# Patient Record
Sex: MALE | Race: WHITE | Employment: OTHER | URBAN - METROPOLITAN AREA
[De-identification: names, ages, dates, MRNs, and addresses within clinical notes are randomized per-mention and may not be internally consistent; named-entity substitution may affect disease eponyms.]

---

## 2023-02-28 ENCOUNTER — HOSPITAL ENCOUNTER (EMERGENCY)
Age: 48
Discharge: HOME | End: 2023-02-28
Attending: EMERGENCY MEDICINE
Payer: OTHER GOVERNMENT

## 2023-02-28 VITALS
BODY MASS INDEX: 32.9 KG/M2 | SYSTOLIC BLOOD PRESSURE: 144 MMHG | HEIGHT: 71 IN | WEIGHT: 235 LBS | DIASTOLIC BLOOD PRESSURE: 86 MMHG | RESPIRATION RATE: 18 BRPM | OXYGEN SATURATION: 99 % | HEART RATE: 80 BPM

## 2023-02-28 DIAGNOSIS — K64.9 HEMORRHOIDS, UNSPECIFIED HEMORRHOID TYPE: Primary | ICD-10-CM

## 2023-02-28 PROCEDURE — 6360000002 HC RX W HCPCS: Performed by: NURSE PRACTITIONER

## 2023-02-28 PROCEDURE — 99284 EMERGENCY DEPT VISIT MOD MDM: CPT

## 2023-02-28 PROCEDURE — 96372 THER/PROPH/DIAG INJ SC/IM: CPT

## 2023-02-28 PROCEDURE — 6370000000 HC RX 637 (ALT 250 FOR IP): Performed by: NURSE PRACTITIONER

## 2023-02-28 RX ORDER — OXYCODONE AND ACETAMINOPHEN 7.5; 325 MG/1; MG/1
1 TABLET ORAL
Status: COMPLETED | OUTPATIENT
Start: 2023-02-28 | End: 2023-02-28

## 2023-02-28 RX ORDER — DOCUSATE SODIUM 100 MG/1
100 CAPSULE, LIQUID FILLED ORAL 2 TIMES DAILY
Qty: 30 CAPSULE | Refills: 0 | Status: SHIPPED | OUTPATIENT
Start: 2023-02-28

## 2023-02-28 RX ORDER — PRAMOXINE HYDROCHLORIDE 10 MG/G
AEROSOL, FOAM TOPICAL
Qty: 15 G | Refills: 0 | Status: SHIPPED | OUTPATIENT
Start: 2023-02-28

## 2023-02-28 RX ORDER — OXYCODONE HYDROCHLORIDE 5 MG/1
5 TABLET ORAL EVERY 8 HOURS PRN
Qty: 9 TABLET | Refills: 0 | Status: SHIPPED | OUTPATIENT
Start: 2023-02-28 | End: 2023-03-03

## 2023-02-28 RX ORDER — HYDROCORTISONE ACETATE 25 MG/1
25 SUPPOSITORY RECTAL 2 TIMES DAILY
Qty: 12 SUPPOSITORY | Refills: 0 | Status: SHIPPED | OUTPATIENT
Start: 2023-02-28

## 2023-02-28 RX ORDER — MORPHINE SULFATE 4 MG/ML
4 INJECTION INTRAVENOUS ONCE
Status: COMPLETED | OUTPATIENT
Start: 2023-02-28 | End: 2023-02-28

## 2023-02-28 RX ADMIN — MORPHINE SULFATE 4 MG: 4 INJECTION INTRAVENOUS at 15:09

## 2023-02-28 RX ADMIN — OXYCODONE AND ACETAMINOPHEN 1 TABLET: 7.5; 325 TABLET ORAL at 14:39

## 2023-02-28 ASSESSMENT — ENCOUNTER SYMPTOMS
VOMITING: 0
NAUSEA: 0
RECTAL PAIN: 1
DIARRHEA: 0
BACK PAIN: 0
HEMATOCHEZIA: 0
CONSTIPATION: 1
ABDOMINAL PAIN: 0

## 2023-02-28 ASSESSMENT — PAIN DESCRIPTION - LOCATION
LOCATION: RECTUM

## 2023-02-28 ASSESSMENT — PAIN SCALES - GENERAL
PAINLEVEL_OUTOF10: 10
PAINLEVEL_OUTOF10: 6

## 2023-02-28 ASSESSMENT — LIFESTYLE VARIABLES
HOW MANY STANDARD DRINKS CONTAINING ALCOHOL DO YOU HAVE ON A TYPICAL DAY: 3 OR 4
HOW OFTEN DO YOU HAVE A DRINK CONTAINING ALCOHOL: MONTHLY OR LESS

## 2023-02-28 NOTE — ED NOTES
I have reviewed discharge instructions with the patient. The patient verbalized understanding. Patient left ED via Discharge Method: ambulatory to Home with  an Patricia 19 for questions and clarification provided. Patient given 4 scripts. Rx sent to [facility]        To continue your aftercare when you leave the hospital, you may receive an automated call from our care team to check in on how you are doing. This is a free service and part of our promise to provide the best care and service to meet your aftercare needs.  If you have questions, or wish to unsubscribe from this service please call [phone]. Thank you for Choosing our [facility] Emergency Department.         Sylvain Noel RN  02/28/23 9398

## 2023-02-28 NOTE — DISCHARGE INSTRUCTIONS
Take medication as prescribed. The pain medication can make constipation worse. Use over-the-counter Miralax if needed in addition to the stool softener. Use this sparingly. Continue Sitz baths. Continue high-fiber diet. Follow-up with your primary care provider. Return to the ER for any new, worsening, or concerning symptoms.

## 2023-02-28 NOTE — ED PROVIDER NOTES
Emergency Department Provider Note                   PCP:                None Provider               Age: 50 y.o. Sex: male       ICD-10-CM    1. Hemorrhoids, unspecified hemorrhoid type  K64.9 oxyCODONE (ROXICODONE) 5 MG immediate release tablet          DISPOSITION Decision To Discharge 02/28/2023 03:41:24 PM       MEDICAL DECISION MAKING  Complexity of Problems Addressed:  Chronic illness with acute exacerbation    Data Reviewed and Analyzed:  Category 1:   I reviewed records from an external source: [facility] and no [facility] available at time of treatment. I ordered each unique test.  I reviewed the results of each unique test.        Category 2:     Category 3: Discussion of management or test interpretation. 49-year-old male presents to the emergency department today with complaint of hemorrhoid pain after a difficult bowel movement 1 week ago. He attempted a bowel movement today which increased pain. He appears uncomfortable but in no acute distress. Current symptoms consistent with previous episodes of hemorrhoids. Discussed treatment options with pain control and steroid suppository. Discussed Labs and CT imaging today to rule out other possible acute process but patient states pain is consistent with hemorrhoid pain. Physical exam reveals internal hemorrhoids. No sign of abscess on exam. He is afebrile and appears otherwise well today. Risk of Complications and/or Morbidity of Patient Management:  Prescription drug management completed, Parental controlled substances given in the ED, and Considerations: The following items were considered but not ordered: labs, CT of abdomen and pelvis- through shared decision making, no further workup initiated in the ER today. Patient is hemodynamically stable and afebrile. Symptoms consistent with previous episodes of hemorrhoid pain.       Rob Mejia is a 50 y.o. male who presents to the Emergency Department with chief complaint of    Chief Complaint   Patient presents with    Hemorrhoids      47-year-old male who presents to the emergency department today with complaint of hemorrhoids. He states he had a very difficult bowel movement 1 week ago and has been having pain since. He has been using Preparation-H without relief. He reports a history of hemorrhoids. Pain is consistent with hemorrhoid pain but worse than previous episodes. He reports constipation. He denies any fever, chills, chest pain, abdominal pain, nausea, vomiting, or diarrhea. He states he has no significant medical history. He follows a high fiber diet and this is typically enough to manage hemorrhoids. The history is provided by the patient. Constipation  Severity:  Moderate  Chronicity:  Chronic  Relieved by:  Fiber  Ineffective treatments: preparation-h. Associated symptoms: no abdominal pain, no back pain, no diarrhea, no dysuria, no fever, no hematochezia, no nausea, no urinary retention and no vomiting       Review of Systems   Constitutional:  Negative for fever. Gastrointestinal:  Positive for constipation and rectal pain. Negative for abdominal pain, diarrhea, hematochezia, nausea and vomiting. Genitourinary:  Negative for dysuria. Musculoskeletal:  Negative for back pain. All other systems reviewed and are negative. Vitals signs and nursing note reviewed. Patient Vitals for the past 4 hrs:   Pulse Resp BP SpO2   02/28/23 1543 80 18 (!) 144/86 99 %   02/28/23 1430 100 18 (!) 156/76 97 %          Physical Exam  Vitals and nursing note reviewed. Exam conducted with a chaperone present (BORIS Blanca). Constitutional:       General: He is not in acute distress. Appearance: Normal appearance. He is not ill-appearing, toxic-appearing or diaphoretic. Comments: Appears uncomfortable but is in no acute distress   HENT:      Head: Normocephalic and atraumatic.       Right Ear: External ear normal.      Left Ear: External ear normal.      Nose: Nose normal.   Eyes:      Extraocular Movements: Extraocular movements intact. Conjunctiva/sclera: Conjunctivae normal.   Cardiovascular:      Rate and Rhythm: Normal rate. Pulmonary:      Effort: Pulmonary effort is normal. No respiratory distress. Abdominal:      General: Abdomen is flat. There is no distension. Genitourinary:     Rectum: Tenderness and internal hemorrhoid present. No mass, anal fissure or external hemorrhoid. Normal anal tone. Comments: Internal hemorrhoids with tenderness. No bleeding noted. No external hemorrhoids noted. Musculoskeletal:         General: Normal range of motion. Cervical back: Normal range of motion. Skin:     General: Skin is warm and dry. Neurological:      General: No focal deficit present. Mental Status: He is alert and oriented to person, place, and time. Psychiatric:         Mood and Affect: Mood normal.         Behavior: Behavior normal.        Procedures          No orders of the defined types were placed in this encounter. Medications   oxyCODONE-acetaminophen (PERCOCET) 7.5-325 MG per tablet 1 tablet (1 tablet Oral Given 2/28/23 4458)   morphine injection 4 mg (4 mg IntraMUSCular Given 2/28/23 9076)       Discharge Medication List as of 2/28/2023  3:42 PM        START taking these medications    Details   pramoxine HCl 1 % foam Apply as needed up to 5 times daily. , Disp-15 g, R-0, Normal      hydrocortisone (ANUSOL-HC) 25 MG suppository Place 1 suppository rectally 2 times daily, Disp-12 suppository, R-0Normal      docusate sodium (COLACE) 100 MG capsule Take 1 capsule by mouth 2 times daily, Disp-30 capsule, R-0Normal      oxyCODONE (ROXICODONE) 5 MG immediate release tablet Take 1 tablet by mouth every 8 hours as needed for Pain for up to 3 days. Intended supply: 3 days. Take lowest dose possible to manage pain Max Daily Amount: 15 mg, Disp-9 tablet, R-0Normal              History reviewed. No pertinent past medical history. Past Surgical History:   Procedure Laterality Date    ABDOMEN SURGERY      ORTHOPEDIC SURGERY          History reviewed. No pertinent family history. Social History     Socioeconomic History    Marital status:      Spouse name: None    Number of children: None    Years of education: None    Highest education level: None   Tobacco Use    Smoking status: Never    Smokeless tobacco: Never   Substance and Sexual Activity    Alcohol use: Never    Drug use: Yes     Comment: thc gummies        Allergies: Patient has no known allergies. Discharge Medication List as of 2/28/2023  3:42 PM           No results found for any visits on 02/28/23. No orders to display                     Voice dictation software was used during the making of this note. This software is not perfect and grammatical and other typographical errors may be present. This note has not been completely proofread for errors.        MARGARITO Ruiz - [place]  02/28/23 7550

## 2023-02-28 NOTE — ED NOTES
Rectal exam by [facility] reveals internal hemorrhoids.       Greg Mederos, RN  02/28/23 3708

## 2023-03-02 ENCOUNTER — HOSPITAL ENCOUNTER (EMERGENCY)
Age: 48
Discharge: HOME | End: 2023-03-02
Attending: EMERGENCY MEDICINE
Payer: OTHER GOVERNMENT

## 2023-03-02 VITALS
BODY MASS INDEX: 32.9 KG/M2 | HEART RATE: 86 BPM | OXYGEN SATURATION: 95 % | HEIGHT: 71 IN | RESPIRATION RATE: 18 BRPM | DIASTOLIC BLOOD PRESSURE: 87 MMHG | TEMPERATURE: 98.9 F | SYSTOLIC BLOOD PRESSURE: 126 MMHG | WEIGHT: 235 LBS

## 2023-03-02 DIAGNOSIS — K64.5 THROMBOSED EXTERNAL HEMORRHOID: Primary | ICD-10-CM

## 2023-03-02 PROCEDURE — 6370000000 HC RX 637 (ALT 250 FOR IP)

## 2023-03-02 PROCEDURE — 99283 EMERGENCY DEPT VISIT LOW MDM: CPT

## 2023-03-02 RX ORDER — OXYCODONE AND ACETAMINOPHEN 7.5; 325 MG/1; MG/1
1 TABLET ORAL
Status: DISCONTINUED | OUTPATIENT
Start: 2023-03-02 | End: 2023-03-02

## 2023-03-02 RX ORDER — LORAZEPAM 0.5 MG/1
0.5 TABLET ORAL ONCE
Status: COMPLETED | OUTPATIENT
Start: 2023-03-02 | End: 2023-03-02

## 2023-03-02 RX ADMIN — LORAZEPAM 0.5 MG: 0.5 TABLET ORAL at 16:16

## 2023-03-02 ASSESSMENT — PAIN SCALES - GENERAL
PAINLEVEL_OUTOF10: 10
PAINLEVEL_OUTOF10: 5
PAINLEVEL_OUTOF10: 4

## 2023-03-02 ASSESSMENT — PAIN DESCRIPTION - PAIN TYPE: TYPE: ACUTE PAIN

## 2023-03-02 ASSESSMENT — PAIN DESCRIPTION - FREQUENCY: FREQUENCY: INTERMITTENT

## 2023-03-02 ASSESSMENT — PAIN - FUNCTIONAL ASSESSMENT: PAIN_FUNCTIONAL_ASSESSMENT: 0-10

## 2023-03-02 ASSESSMENT — PAIN DESCRIPTION - LOCATION: LOCATION: RECTUM

## 2023-03-02 NOTE — DISCHARGE INSTRUCTIONS
You have what is called a thrombosed external hemorrhoid. I have initiated outpatient referral to outpatient general surgery. Follow-up with general surgery appointment. Continue fiber supplement, Preparation H, laxatives and stool softeners, and take oxycodone as needed for pain relief.

## 2023-03-02 NOTE — ED TRIAGE NOTES
Pt reports rectal pain, has hemorrhoids. Reports they prolapsed. Seen recently and given medications with no relief.

## 2023-03-02 NOTE — ED NOTES
I have reviewed discharge instructions with the patient. The patient verbalized understanding. Patient left ED via Discharge Method: ambulatory to Home with self      Opportunity for questions and clarification provided. Patient given 0 scripts. To continue your aftercare when you leave the hospital, you may receive an automated call from our care team to check in on how you are doing. This is a free service and part of our promise to provide the best care and service to meet your aftercare needs.  If you have questions, or wish to unsubscribe from this service please call [phone]. Thank you for Choosing our [facility] Emergency Department.         Ash Gage RN  03/02/23 6474

## 2023-03-03 NOTE — ED PROVIDER NOTES
[facility] Emergency Department Provider Note                   PCP:                NOT ON FILE               Age: 50 y.o. Sex: male       ICD-10-CM    1. Thrombosed external hemorrhoid  K64.5 [address] [facility] [place]          DISPOSITION Decision To Discharge 03/02/2023 04:26:06 PM         Orders Placed This Encounter   Procedures    [address] [facility], [place] Higuera Boy is a 50 y.o. male who presents to the Emergency Department with chief complaint of    Chief Complaint   Patient presents with    Rectal Pain      40-year-old male presents to the emergency department with complaint of rectal pain. States that he has had some difficulty with bowel movements for approximately 1.5 weeks. He was seen in the emergency department very recently and was advised to follow an increased fiber intake regimen and use Anusol and Preparation H for relief. He states that his symptoms are not improving and is unable to sit down without significant pain and is having to use pillows and towels underneath his bottom. The history is provided by the patient. Review of Systems    No past medical history on file. Past Surgical History:   Procedure Laterality Date    ABDOMEN SURGERY      ORTHOPEDIC SURGERY          No family history on file. Social History     Socioeconomic History    Marital status:    Tobacco Use    Smoking status: Never    Smokeless tobacco: Never   Substance and Sexual Activity    Alcohol use: Never    Drug use: Yes     Comment: thc gummies         Patient has no known allergies. Discharge Medication List as of 3/2/2023  4:31 PM        CONTINUE these medications which have NOT CHANGED    Details   pramoxine HCl 1 % foam Apply as needed up to 5 times daily. , Disp-15 g, R-0, Normal      hydrocortisone (ANUSOL-HC) 25 MG suppository Place 1 suppository rectally 2 times daily, Disp-12 suppository, R-0Normal      docusate sodium (COLACE) 100 MG capsule Take 1 capsule by mouth 2 times daily, Disp-30 capsule, R-0Normal      oxyCODONE (ROXICODONE) 5 MG immediate release tablet Take 1 tablet by mouth every 8 hours as needed for Pain for up to 3 days. Intended supply: 3 days. Take lowest dose possible to manage pain Max Daily Amount: 15 mg, Disp-9 tablet, R-0Normal              Vitals signs and nursing note reviewed. No data found. Physical Exam  Constitutional:       General: He is in acute distress. Appearance: Normal appearance. He is not ill-appearing. HENT:      Head: Normocephalic and atraumatic. Eyes:      Extraocular Movements: Extraocular movements intact. Conjunctiva/sclera: Conjunctivae normal.      Pupils: Pupils are equal, round, and reactive to light. Genitourinary:     Comments: Rectal examination reveals a approximately 2 x 2 centimeter reddish-purple appearing lump protruding from the rectum which is tender to palpation. Neurological:      Mental Status: He is alert. MDM  Number of Diagnoses or Management Options  Thrombosed external hemorrhoid  Diagnosis management comments: There is a reducible reddish-purple 2 x 2 centimeter lump protruding from the rectum. This lump appears to be a thrombosed external hemorrhoid. Patient needs to follow-up in the outpatient setting with general surgery for further evaluation and management. Advised him to continue current regimen of increased fiber intake, Anusol/Preparation H, and oxycodone as needed relief. Advised of return precautions. Complexity of Problem: 1 acute complicated illness or injury. (4)      I reviewed records from an external source: ED records from outside this hospital.          Patient was discharged risks and benefits of hospitalization were considered.          Amount and/or Complexity of Data Reviewed  Decide to obtain previous medical records or to obtain history from someone other than the patient: yes  Review and summarize past medical records: yes        Procedures      Labs Reviewed - No data to display     No orders to display                          Voice dictation software was used during the making of this note. This software is not perfect and grammatical and other typographical errors may be present. This note has not been completely proofread for errors.      JOHNNIE Wyman  03/02/23 4546

## 2023-03-07 ENCOUNTER — OFFICE VISIT (OUTPATIENT)
Dept: SURGERY | Age: 48
End: 2023-03-07
Payer: OTHER GOVERNMENT

## 2023-03-07 ENCOUNTER — PREP FOR PROCEDURE (OUTPATIENT)
Dept: SURGERY | Age: 48
End: 2023-03-07

## 2023-03-07 VITALS
DIASTOLIC BLOOD PRESSURE: 80 MMHG | HEIGHT: 71 IN | HEART RATE: 106 BPM | WEIGHT: 235 LBS | OXYGEN SATURATION: 98 % | BODY MASS INDEX: 32.9 KG/M2 | SYSTOLIC BLOOD PRESSURE: 142 MMHG

## 2023-03-07 DIAGNOSIS — K64.5 HEMORRHOID THROMBOSIS: Primary | ICD-10-CM

## 2023-03-07 PROBLEM — K64.9 HEMORRHOIDS: Status: ACTIVE | Noted: 2023-03-07

## 2023-03-07 PROCEDURE — 99203 OFFICE O/P NEW LOW 30 MIN: CPT | Performed by: SURGERY

## 2023-03-07 RX ORDER — SODIUM CHLORIDE 9 MG/ML
INJECTION, SOLUTION INTRAVENOUS PRN
Status: CANCELLED | OUTPATIENT
Start: 2023-03-07

## 2023-03-07 RX ORDER — SODIUM CHLORIDE 0.9 % (FLUSH) 0.9 %
5-40 SYRINGE (ML) INJECTION PRN
Status: CANCELLED | OUTPATIENT
Start: 2023-03-07

## 2023-03-07 RX ORDER — SODIUM CHLORIDE 0.9 % (FLUSH) 0.9 %
5-40 SYRINGE (ML) INJECTION EVERY 12 HOURS SCHEDULED
Status: CANCELLED | OUTPATIENT
Start: 2023-03-07

## 2023-03-07 NOTE — PROGRESS NOTES
[facility]  [facility]. [address] [place] [address]  [phone]     3/7/2023  Patient:  Janet Byrd  : 1975    [facility]  Janet Byrd is a 50 y.o. male who presents today in consultation for a thrombosed hemorrhoid. The patient states that for about 2.5 weeks he has had a lump near his anus that has been extremely painful. He states that he has had hemorrhoids in the past, but never to this extent. He states that it has been bleeding over the last few days. The pain has improved, but is still present. He states that he is having regular Bms. He states it is very painful when he has a BM and feels like glass is coming out with his stool. He has no significant past medical history. He has past surgical history of a cholecystectomy. He is not on any blood thinning medications. History reviewed. No pertinent past medical history. Current Outpatient Medications   Medication Sig Dispense Refill    pramoxine HCl 1 % foam Apply as needed up to 5 times daily. 15 g 0    hydrocortisone (ANUSOL-HC) 25 MG suppository Place 1 suppository rectally 2 times daily 12 suppository 0    docusate sodium (COLACE) 100 MG capsule Take 1 capsule by mouth 2 times daily 30 capsule 0     No current facility-administered medications for this visit. No Known Allergies  Past Surgical History:   Procedure Laterality Date    ABDOMEN SURGERY      ORTHOPEDIC SURGERY       History reviewed. No pertinent family history. Social History     Tobacco Use    Smoking status: Never    Smokeless tobacco: Never   Substance Use Topics    Alcohol use: Never        Review of Systems   A comprehensive review of systems was negative except for that written in the HPI.      Physical Exam  BP (!) 142/80   Pulse (!) 106   Ht 5' 11\" (1.803 m)   Wt 235 lb (106.6 kg)   SpO2 98%   BMI 32.78 kg/m²      General: Alert, oriented, cooperative, awake patient in no acute distress   Skin:  Warm, moist with good texture Eyes:   Sclera are clear, extraocular muscles intact  HENT:  Normocephalic; oral mucosa moist, nares patent; neck is supple; trachea midline  Respiratory: Lungs clear to auscultation bilaterally, breathing is non-labored   Chest:  Symmetric throughout one respiratory excursion; no supraclavicular lymphadenopathy  CV:  Regular rate and rhythm, no appreciable murmurs, rubs, gallops  Abdomen: Soft, protuberant but non-distended; bowel sounds are normoactive   Extremities: No cyanosis, clubbing or edema  Neurological: No focal signs  Rectal: There is what appears to be a partially drained thrombosed right lateral hemorrhoid. Exam is limited secondary to the patients pain. Labs: No results found for: CBC, CMP, BMP, APTT, INR     Assessment/Plan:  Govind Dorman is a 50 y.o. male who has signs and symptoms consistent with at least a thrombosed hemorrhoid. It was discussed with the patient that he appears to have a thrombosed external hemorrhoid that has partially drained. We are unable to perform a proper exam at today's visit due to his pain. We will schedule him to undergo an anal exam under anaesthesia for evaluation and treatment, and at least perform an excisional hemorrhoidectomy at that time. Discussed the patient's condition and treatment options with the patient. Discussed risks of surgery in language the patient could understand. The patient voiced understanding of all this and all questions were answered. Alternatives to surgery were discussed also and risks of the alternatives. The patient requested that we proceed with surgery.          Imani Lund MD